# Patient Record
Sex: FEMALE | Race: WHITE | NOT HISPANIC OR LATINO | Employment: OTHER | URBAN - METROPOLITAN AREA
[De-identification: names, ages, dates, MRNs, and addresses within clinical notes are randomized per-mention and may not be internally consistent; named-entity substitution may affect disease eponyms.]

---

## 2017-03-07 PROBLEM — E78.5 DYSLIPIDEMIA: Status: ACTIVE | Noted: 2017-03-07

## 2017-03-07 PROBLEM — N18.9 CKD (CHRONIC KIDNEY DISEASE): Status: ACTIVE | Noted: 2017-03-07

## 2017-03-07 PROBLEM — I44.7 LBBB (LEFT BUNDLE BRANCH BLOCK): Status: ACTIVE | Noted: 2017-03-07

## 2017-03-07 PROBLEM — R00.2 PALPITATIONS: Status: ACTIVE | Noted: 2017-03-07

## 2017-03-07 PROBLEM — H81.10 BENIGN POSITIONAL VERTIGO: Status: ACTIVE | Noted: 2017-03-07

## 2017-03-07 PROBLEM — E03.9 HYPOTHYROIDISM: Status: ACTIVE | Noted: 2017-03-07

## 2017-03-07 PROBLEM — I10 HYPERTENSION: Status: ACTIVE | Noted: 2017-03-07

## 2017-04-07 RX ORDER — LEVOTHYROXINE SODIUM 0.05 MG/1
50 TABLET ORAL DAILY
COMMUNITY

## 2017-04-07 RX ORDER — TELMISARTAN 80 MG/1
80 TABLET ORAL DAILY
COMMUNITY

## 2017-04-07 RX ORDER — OMEGA-3-ACID ETHYL ESTERS 1 G/1
2 CAPSULE, LIQUID FILLED ORAL DAILY
COMMUNITY

## 2017-04-07 RX ORDER — METOPROLOL SUCCINATE 50 MG/1
50 TABLET, EXTENDED RELEASE ORAL DAILY
COMMUNITY

## 2017-04-10 ENCOUNTER — OFFICE VISIT (OUTPATIENT)
Dept: NEUROSURGERY | Facility: CLINIC | Age: 72
End: 2017-04-10

## 2017-04-10 VITALS
TEMPERATURE: 98.2 F | SYSTOLIC BLOOD PRESSURE: 130 MMHG | HEIGHT: 66 IN | DIASTOLIC BLOOD PRESSURE: 68 MMHG | WEIGHT: 125 LBS | BODY MASS INDEX: 20.09 KG/M2

## 2017-04-10 DIAGNOSIS — I67.1 CEREBRAL ANEURYSM, NONRUPTURED: Primary | ICD-10-CM

## 2017-04-10 PROCEDURE — 99203 OFFICE O/P NEW LOW 30 MIN: CPT | Performed by: NEUROLOGICAL SURGERY

## 2017-04-10 NOTE — PROGRESS NOTES
NAME: DELICIA DELGADO   DOS: 4/10/2017  : 1945  PCP: Nora Hanson MD    Chief Complaint:  Cerebral aneurysm  Chief Complaint   Patient presents with   • Cerebral Aneurysm       History of Present Illness:  71 y.o. female   71-year-old female with a history of intermittent since symptoms the reports a history of polycystic kidney disease and a mother who  from an aneurysm in her 50s.  It's unclear specifically that she had a cerebral aneurysm however she did undergo surgery.    She reports various symptoms cranially occasional headaches and generalized weakness but denies anything to suggest a subarachnoid hemorrhage she's here for evaluation.  She's been diagnosed since  and followed up for a small 2 mm aneurysm and was sent to me for evaluation.  PMHX  Allergies:  Allergies   Allergen Reactions   • Levofloxacin      chest pain, diaphoresis.     • Metronidazole      Chest pain, diaphoresis   • Statins      Muscle weakness   • Zestoretic [Lisinopril-Hydrochlorothiazide]      Weakness      • Sulfa Antibiotics Rash     Medications    Current Outpatient Prescriptions:   •  levothyroxine (SYNTHROID, LEVOTHROID) 50 MCG tablet, Take 50 mcg by mouth Daily., Disp: , Rfl:   •  metoprolol succinate XL (TOPROL-XL) 50 MG 24 hr tablet, Take 50 mg by mouth Daily., Disp: , Rfl:   •  omega-3 acid ethyl esters (LOVAZA) 1 G capsule, Take 2 g by mouth Daily., Disp: , Rfl:   •  telmisartan (MICARDIS) 80 MG tablet, Take 80 mg by mouth Daily., Disp: , Rfl:   Past Medical History:  Past Medical History:   Diagnosis Date   • Benign positional vertigo 3/7/2017   • CKD (chronic kidney disease) 3/7/2017   • Dyslipidemia 3/7/2017   • Hypertension 3/7/2017   • Hypothyroidism 3/7/2017   • LBBB (left bundle branch block) 3/7/2017   • Palpitations 3/7/2017     Past Surgical History:  Past Surgical History:   Procedure Laterality Date   • INGUINAL HERNIA REPAIR Right    • WRIST FRACTURE SURGERY Left      Social Hx:  Social  History   Substance Use Topics   • Smoking status: Never Smoker   • Smokeless tobacco: None   • Alcohol use No     Family Hx:  Family History   Problem Relation Age of Onset   • Hypertension Mother      Review of Systems:        Review of Systems   Constitutional: Positive for chills and unexpected weight change. Negative for activity change, appetite change, diaphoresis, fatigue and fever.   HENT: Negative for congestion, dental problem, drooling, ear discharge, ear pain, facial swelling, hearing loss, mouth sores, nosebleeds, postnasal drip, rhinorrhea, sinus pressure, sneezing, sore throat, tinnitus, trouble swallowing and voice change.    Eyes: Negative for photophobia, pain, discharge, redness, itching and visual disturbance.   Respiratory: Negative for apnea, cough, choking, chest tightness, shortness of breath, wheezing and stridor.    Cardiovascular: Negative for chest pain, palpitations and leg swelling.   Gastrointestinal: Negative for abdominal distention, abdominal pain, anal bleeding, blood in stool, constipation, diarrhea, nausea, rectal pain and vomiting.   Endocrine: Negative for cold intolerance, heat intolerance, polydipsia, polyphagia and polyuria.   Genitourinary: Negative for decreased urine volume, difficulty urinating, dysuria, enuresis, flank pain, frequency, genital sores, hematuria and urgency.   Musculoskeletal: Negative for arthralgias, back pain, gait problem, joint swelling, myalgias, neck pain and neck stiffness.   Skin: Negative for color change, pallor, rash and wound.   Allergic/Immunologic: Negative for environmental allergies, food allergies and immunocompromised state.   Neurological: Positive for weakness. Negative for dizziness, tremors, seizures, syncope, facial asymmetry, speech difficulty, light-headedness, numbness and headaches.   Hematological: Negative for adenopathy. Does not bruise/bleed easily.   Psychiatric/Behavioral: Negative for agitation, behavioral problems,  confusion, decreased concentration, dysphoric mood, hallucinations, self-injury, sleep disturbance and suicidal ideas. The patient is not nervous/anxious and is not hyperactive.    All other systems reviewed and are negative.           Physical Examination:  Vitals:    04/10/17 1447   Temp: 98.2 °F (36.8 °C)      General Appearance:   Well developed, very lean, well groomed, alert, and cooperative.  Neurological examination:  Neurologic Exam  Vital signs were reviewed and documented in the chart  Patient appeared in good neurologic function with normal comprehension fluent speech  Mood and affect are normal  Sense of smell deferred    Pupils symmetric equally reactive funduscopic exam not visualized   Visual fields intact to confrontation  Extraocular movements intact  Face motor function is symmetric  Facial sensations normal  Hearing intact to finger rub hearing intact to finger rub  Tongue is midline  Palate symmetric  Swallowing normal  Shoulder shrug normal    Muscle bulk and tone normal  5 out of 5 strength no motor drift  Gait normal intact  Negative Romberg  No clonus long tract signs or myelopathy    Reflexes symmetric  No edema noted and extremities skin appears normal            Review of Imaging/DATA:  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 Pathak is a 71 y.o. 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  I discussed with her the risks that this would cause problems as well as the signs and symptoms that it could produce such as a cc fistula etc.  She's been followed serially with this with 3 MRIs it's been stable from a neurosurgical standpoint given her age find the likelihood that she should undergo ongoing monitoring in order to prevent a rupture or treatment event is low.  From my standpoint I can see her back as needed I be happy and I explained this  to get an MRI to evaluate this down the road if she has any symptoms but again I think this is an incidental noted extra cranial aneurysm and presents extremely low risk if not 0 risk for cranial hemorrhage.

## 2017-07-25 ENCOUNTER — OFFICE VISIT (OUTPATIENT)
Dept: CARDIOLOGY | Facility: CLINIC | Age: 72
End: 2017-07-25

## 2017-07-25 VITALS
DIASTOLIC BLOOD PRESSURE: 78 MMHG | HEIGHT: 66 IN | SYSTOLIC BLOOD PRESSURE: 130 MMHG | BODY MASS INDEX: 20.39 KG/M2 | HEART RATE: 84 BPM | WEIGHT: 126.9 LBS

## 2017-07-25 DIAGNOSIS — R00.2 PALPITATIONS: ICD-10-CM

## 2017-07-25 DIAGNOSIS — E78.5 DYSLIPIDEMIA: ICD-10-CM

## 2017-07-25 DIAGNOSIS — I44.7 LBBB (LEFT BUNDLE BRANCH BLOCK): Primary | ICD-10-CM

## 2017-07-25 DIAGNOSIS — I10 ESSENTIAL HYPERTENSION: ICD-10-CM

## 2017-07-25 PROCEDURE — 99213 OFFICE O/P EST LOW 20 MIN: CPT | Performed by: INTERNAL MEDICINE

## 2017-07-25 NOTE — PROGRESS NOTES
Cara Pathak  1945  [phone]      07/25/2017    Nora Hanson MD    Chief Complaint   Patient presents with   • LBBB (left bundle branch block)   • Palpitations       PROBLEM LIST:  1. Left bundle branch block:  a. Echocardiogram, 2009:  Moderate MR; normal EF.  b. Vasodilator nuclear stress test:  Negative for ischemia; LVEF (77%), 08/12/2009.   2. History of benign positional vertigo.  3. Palpitations.  4. Hypertension.  5. Dyslipidemia with history of statin intolerance.  6. Hypothyroidism.  7. Polycystic kidney disease/chronic kidney disease Stage II-III.  8. Cerebral aneurysm  a. 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 History of tobacco abuse in college: cessation after college.  10. Remote surgical history:  a. Right inguinal hernia repair.  b. Left wrist fracture repair.      Allergies   Allergen Reactions   • Levofloxacin      chest pain, diaphoresis.     • Metronidazole      Chest pain, diaphoresis   • Statins      Muscle weakness   • Zestoretic [Lisinopril-Hydrochlorothiazide]      Weakness      • Sulfa Antibiotics Rash       Current Medications:    Current Outpatient Prescriptions:   •  levothyroxine (SYNTHROID, LEVOTHROID) 50 MCG tablet, Take 50 mcg by mouth Daily., Disp: , Rfl:   •  metoprolol succinate XL (TOPROL-XL) 50 MG 24 hr tablet, Take 50 mg by mouth Daily., Disp: , Rfl:   •  omega-3 acid ethyl esters (LOVAZA) 1 G capsule, Take 2 g by mouth Daily., Disp: , Rfl:   •  telmisartan (MICARDIS) 80 MG tablet, Take 80 mg by mouth Daily., Disp: , Rfl:     History of Present Illness (HPI):   Cara Pathak presents today for a 12 month follow-up of left bundle branch block, palpitations, hypertension, and dyslipidemia. Since last visit, she states her cholesterol has been high. She notes that she experiences dyspnea on exertion as well as when she is at rest. Overall, the patient is doing well from a cardiac standpoint.  "Patient denies chest pain, palpitations, shortness of breath, edema, PND, orthopnea, dizziness, and syncope. She notes that she has no family history of myocardial infarctions or other cardiovascular issues. She also mentions that she used tobacco when she was in college, but stopped after that and has not used it since then.    The following portions of the patient's history were reviewed and updated as appropriate: allergies, current medications and problem list.    Pertinent positives as listed in the HPI.  All other systems reviewed are negative.    Vitals:    07/25/17 1420   BP: 130/78   BP Location: Right arm   Patient Position: Sitting   Pulse: 84   Weight: 126 lb 14.4 oz (57.6 kg)   Height: 66\" (167.6 cm)       Physical Exam:   General: Alert, awake, and oriented. Well-developed, well-nourished; in no acute distress.  Neck: Jugular venous pressure is within normal limits. Carotids have normal upstrokes without bruits.   Cardiovascular: Heart has a nondisplaced focal PMI. Regular rate and rhythm without murmur, gallop or rub.  Lungs: Clear without rales or wheezes. Equal expansion is noted.   Extremities: Show no edema.  Skin: Warm and dry.  Neurologic: Non-focal.  Peripheral vascular:  Posterior tibial and dorsalis pedis pulses are 2+ and symmetrical. There is no peripheral edema.     Diagnostic Data:      Procedures    Assessment:    ICD-10-CM ICD-9-CM   1. LBBB (left bundle branch block) I44.7 426.3   2. Palpitations R00.2 785.1   3. Essential hypertension I10 401.9   4. Dyslipidemia E78.5 272.4       Plan:  1. Obtain echocardiogram for LBBB  2. Continue \"current medications\" as listed above.  3. Follow-up in 12 months or sooner, if needed at the [facility].      Scribed for Kay Whitaker MD by Jayne Hendricks. 7/25/2017  2:33 PM     I Kay Whitaker MD personally performed the services described in this documentation as scribed by the above individual in my presence, and it is both " accurate and complete.    Kay Whitaker MD, FACC